# Patient Record
Sex: MALE | Race: ASIAN | NOT HISPANIC OR LATINO
[De-identification: names, ages, dates, MRNs, and addresses within clinical notes are randomized per-mention and may not be internally consistent; named-entity substitution may affect disease eponyms.]

---

## 2019-08-22 ENCOUNTER — APPOINTMENT (OUTPATIENT)
Dept: UROLOGY | Facility: CLINIC | Age: 49
End: 2019-08-22
Payer: MEDICAID

## 2019-08-22 VITALS
HEIGHT: 67 IN | HEART RATE: 87 BPM | RESPIRATION RATE: 18 BRPM | TEMPERATURE: 97.8 F | SYSTOLIC BLOOD PRESSURE: 128 MMHG | OXYGEN SATURATION: 97 % | WEIGHT: 166 LBS | BODY MASS INDEX: 26.06 KG/M2 | DIASTOLIC BLOOD PRESSURE: 80 MMHG

## 2019-08-22 DIAGNOSIS — Z78.9 OTHER SPECIFIED HEALTH STATUS: ICD-10-CM

## 2019-08-22 DIAGNOSIS — R39.198 OTHER DIFFICULTIES WITH MICTURITION: ICD-10-CM

## 2019-08-22 PROCEDURE — 99204 OFFICE O/P NEW MOD 45 MIN: CPT

## 2019-08-22 RX ORDER — CLOTRIMAZOLE AND BETAMETHASONE DIPROPIONATE 10; .5 MG/ML; MG/ML
1-0.05 LOTION TOPICAL TWICE DAILY
Qty: 1 | Refills: 1 | Status: ACTIVE | COMMUNITY
Start: 2019-08-22 | End: 1900-01-01

## 2019-08-22 NOTE — HISTORY OF PRESENT ILLNESS
[Urinary Incontinence] : no urinary incontinence [FreeTextEntry1] : Patient is a 50 yo M who presents for redundant prepuce and phimosis for past 2 months.  He is uncircumcised.  Prior to this he had no issues with foreskin and able to retract.  Denies trauma or infection or any obvious inciting event.  Denies h/o diabetes.\par \par He reports sharp moderate penile pain with sexual intercourse, has not been sexually active as a result.  He obtain erections without difficutly.  No diffiulty urinating except for spraying with urination.\par \par He denies diabetes - reports recent testing with PCP showed no diabetes. [Urinary Retention] : no urinary retention [Dysuria] : no dysuria [Hematuria - Gross] : no gross hematuria

## 2019-08-22 NOTE — ASSESSMENT
[FreeTextEntry1] : Patient is a 48 yo M who presents with redundant prepuce/phimosis causing spraying of urination and penile pain,.\par \par I discussed with pt that he has phimosis.  Discussed with pt trial of topical steroid cream. Rx given.  If no improvement would consider circumcision.  Counseled on proper hygiene. \par I also discussed with pt the circumcision procedure and its risks/benefits/alternatives.  I d/w pt that there will be a scar circumferentially around his shaft after foreskin removed, and there are cosmetic considerations to the scar and differences in shaft and foreskin color.  I also d/w pt that he may experience sexual changes/numbness after circumcision.  Also d/w pt typical postop recovery and need for abstinence after circumcision.

## 2019-08-22 NOTE — PHYSICAL EXAM
[General Appearance - Well Developed] : well developed [General Appearance - Well Nourished] : well nourished [Normal Appearance] : normal appearance [Well Groomed] : well groomed [Edema] : no peripheral edema [General Appearance - In No Acute Distress] : no acute distress [Exaggerated Use Of Accessory Muscles For Inspiration] : no accessory muscle use [Respiration, Rhythm And Depth] : normal respiratory rhythm and effort [Abdomen Tenderness] : non-tender [Abdomen Soft] : soft [Costovertebral Angle Tenderness] : no ~M costovertebral angle tenderness [Urethral Meatus] : meatus normal [Scrotum] : the scrotum was normal [Urinary Bladder Findings] : the bladder was normal on palpation [Testes Mass (___cm)] : there were no testicular masses [Testes Tenderness] : no tenderness of the testes [Normal Station and Gait] : the gait and station were normal for the patient's age [] : no rash [No Focal Deficits] : no focal deficits [Oriented To Time, Place, And Person] : oriented to person, place, and time [Mood] : the mood was normal [Affect] : the affect was normal [Not Anxious] : not anxious [No Palpable Adenopathy] : no palpable adenopathy [FreeTextEntry1] : uncirc phallus, phimotic foreskin, unable to retract.  Able to partially visualize glans/meatus - no masses

## 2019-08-27 RX ORDER — CLOTRIMAZOLE 10 MG/G
1 CREAM TOPICAL TWICE DAILY
Qty: 1 | Refills: 1 | Status: ACTIVE | COMMUNITY
Start: 2019-08-27 | End: 1900-01-01

## 2019-08-27 RX ORDER — BETAMETHASONE DIPROPIONATE 0.5 MG/G
0.05 CREAM TOPICAL TWICE DAILY
Qty: 1 | Refills: 1 | Status: ACTIVE | COMMUNITY
Start: 2019-08-27 | End: 1900-01-01

## 2019-09-26 ENCOUNTER — APPOINTMENT (OUTPATIENT)
Dept: UROLOGY | Facility: CLINIC | Age: 49
End: 2019-09-26
Payer: MEDICAID

## 2019-09-26 VITALS
RESPIRATION RATE: 17 BRPM | HEART RATE: 88 BPM | DIASTOLIC BLOOD PRESSURE: 77 MMHG | SYSTOLIC BLOOD PRESSURE: 129 MMHG | BODY MASS INDEX: 25.53 KG/M2 | OXYGEN SATURATION: 98 % | TEMPERATURE: 98.7 F | WEIGHT: 163 LBS

## 2019-09-26 DIAGNOSIS — N48.89 OTHER SPECIFIED DISORDERS OF PENIS: ICD-10-CM

## 2019-09-26 PROCEDURE — 99213 OFFICE O/P EST LOW 20 MIN: CPT

## 2019-09-26 NOTE — HISTORY OF PRESENT ILLNESS
[FreeTextEntry1] : Patient is a 48 yo M who presents for redundant prepuce and phimosis for past 2 months.  He is uncircumcised.  Prior to this he had no issues with foreskin and able to retract.  Denies trauma or infection or any obvious inciting event.  Denies h/o diabetes.\par \par He reports sharp moderate penile pain with sexual intercourse, has not been sexually active as a result.  He obtain erections without difficutly.  No diffiulty urinating except for spraying with urination.\par \par Interval hx:\par He has tried topical cream for past 1 month, he has had softening of phimosis but still unable to fully expose/retract penis and still having pain with sex and spraying.

## 2019-09-26 NOTE — ASSESSMENT
[FreeTextEntry1] : Patient is a 48 yo M who presents with redundant prepuce/phimosis causing spraying of urination and penile pain.\par \par I discussed with pt that he has phimosis. There was some but not complete resolution of his phimosis with conservative topical cream.\par I d/w pt other option is circumcision. Discussed with pt the circumcision procedure and its risks/benefits/alternatives.  I d/w pt that there will be a scar circumferentially around his shaft after foreskin removed, and there are cosmetic considerations to the scar and differences in shaft and foreskin color.  I also d/w pt that he may experience sexual changes/numbness after circumcision.  Also d/w pt typical postop recovery and need for abstinence after circumcision. \par He wishes to proceed, will schedule.

## 2019-09-26 NOTE — PHYSICAL EXAM
[General Appearance - Well Developed] : well developed [General Appearance - Well Nourished] : well nourished [Normal Appearance] : normal appearance [Well Groomed] : well groomed [General Appearance - In No Acute Distress] : no acute distress [Urethral Meatus] : meatus normal [Urinary Bladder Findings] : the bladder was normal on palpation [Scrotum] : the scrotum was normal [Testes Tenderness] : no tenderness of the testes [Testes Mass (___cm)] : there were no testicular masses [FreeTextEntry1] : uncirc phallus, phimotic foreskin, unable to retract fully - softer and more open but still significantly phimotic.  Able to partially visualize glans/meatus - no masses

## 2019-10-23 ENCOUNTER — OUTPATIENT (OUTPATIENT)
Dept: OUTPATIENT SERVICES | Facility: HOSPITAL | Age: 49
LOS: 1 days | End: 2019-10-23
Payer: MEDICAID

## 2019-10-23 VITALS
DIASTOLIC BLOOD PRESSURE: 84 MMHG | OXYGEN SATURATION: 100 % | HEIGHT: 66 IN | RESPIRATION RATE: 16 BRPM | SYSTOLIC BLOOD PRESSURE: 144 MMHG | HEART RATE: 98 BPM | TEMPERATURE: 98 F | WEIGHT: 162.04 LBS

## 2019-10-23 DIAGNOSIS — N47.1 PHIMOSIS: ICD-10-CM

## 2019-10-23 DIAGNOSIS — Z01.818 ENCOUNTER FOR OTHER PREPROCEDURAL EXAMINATION: ICD-10-CM

## 2019-10-23 LAB
ANION GAP SERPL CALC-SCNC: 19 MMOL/L — HIGH (ref 5–17)
BUN SERPL-MCNC: 15 MG/DL — SIGNIFICANT CHANGE UP (ref 7–23)
CALCIUM SERPL-MCNC: 9.6 MG/DL — SIGNIFICANT CHANGE UP (ref 8.4–10.5)
CHLORIDE SERPL-SCNC: 96 MMOL/L — SIGNIFICANT CHANGE UP (ref 96–108)
CO2 SERPL-SCNC: 24 MMOL/L — SIGNIFICANT CHANGE UP (ref 22–31)
CREAT SERPL-MCNC: 1.01 MG/DL — SIGNIFICANT CHANGE UP (ref 0.5–1.3)
GLUCOSE SERPL-MCNC: 251 MG/DL — HIGH (ref 70–99)
HCT VFR BLD CALC: 43.9 % — SIGNIFICANT CHANGE UP (ref 39–50)
HGB BLD-MCNC: 13.2 G/DL — SIGNIFICANT CHANGE UP (ref 13–17)
MCHC RBC-ENTMCNC: 22.6 PG — LOW (ref 27–34)
MCHC RBC-ENTMCNC: 30.1 GM/DL — LOW (ref 32–36)
MCV RBC AUTO: 75.2 FL — LOW (ref 80–100)
PLATELET # BLD AUTO: 231 K/UL — SIGNIFICANT CHANGE UP (ref 150–400)
POTASSIUM SERPL-MCNC: 3.4 MMOL/L — LOW (ref 3.5–5.3)
POTASSIUM SERPL-SCNC: 3.4 MMOL/L — LOW (ref 3.5–5.3)
RBC # BLD: 5.84 M/UL — HIGH (ref 4.2–5.8)
RBC # FLD: 14.2 % — SIGNIFICANT CHANGE UP (ref 10.3–14.5)
SODIUM SERPL-SCNC: 139 MMOL/L — SIGNIFICANT CHANGE UP (ref 135–145)
WBC # BLD: 8.49 K/UL — SIGNIFICANT CHANGE UP (ref 3.8–10.5)
WBC # FLD AUTO: 8.49 K/UL — SIGNIFICANT CHANGE UP (ref 3.8–10.5)

## 2019-10-23 PROCEDURE — G0463: CPT

## 2019-10-23 PROCEDURE — 85027 COMPLETE CBC AUTOMATED: CPT

## 2019-10-23 PROCEDURE — 80048 BASIC METABOLIC PNL TOTAL CA: CPT

## 2019-10-23 RX ORDER — CEFAZOLIN SODIUM 1 G
2000 VIAL (EA) INJECTION ONCE
Refills: 0 | Status: DISCONTINUED | OUTPATIENT
Start: 2019-10-30 | End: 2019-11-16

## 2019-10-23 NOTE — H&P PST ADULT - NSANTHOSAYNRD_GEN_A_CORE
No. ASAF screening performed.  STOP BANG Legend: 0-2 = LOW Risk; 3-4 = INTERMEDIATE Risk; 5-8 = HIGH Risk

## 2019-10-23 NOTE — H&P PST ADULT - HISTORY OF PRESENT ILLNESS
50 yo M with no significant PMH c/o phimosis & redundant prepuce x 6 months. Pt had urology evaluation- scheduled for circumcision on 10/30/2019.

## 2019-10-23 NOTE — H&P PST ADULT - NSICDXPROBLEM_GEN_ALL_CORE_FT
PROBLEM DIAGNOSES  Problem: Phimosis  Assessment and Plan: circumcision  labs- CBC, BMP  Pre op instructions discussed

## 2019-10-29 ENCOUNTER — TRANSCRIPTION ENCOUNTER (OUTPATIENT)
Age: 49
End: 2019-10-29

## 2019-10-29 VITALS — HEIGHT: 65.75 IN | WEIGHT: 162.04 LBS

## 2019-10-29 NOTE — PRE-ANESTHESIA EVALUATION ADULT - NSANTHPMHFT_GEN_ALL_CORE
48 yo M with no significant PMH c/o phimosis & redundant prepuce x 6 months. Pt had urology evaluation- scheduled for circumcision on 10/30/2019.

## 2019-10-30 ENCOUNTER — TRANSCRIPTION ENCOUNTER (OUTPATIENT)
Age: 49
End: 2019-10-30

## 2019-10-30 ENCOUNTER — APPOINTMENT (OUTPATIENT)
Dept: UROLOGY | Facility: HOSPITAL | Age: 49
End: 2019-10-30

## 2019-10-30 ENCOUNTER — RESULT REVIEW (OUTPATIENT)
Age: 49
End: 2019-10-30

## 2019-10-30 ENCOUNTER — OUTPATIENT (OUTPATIENT)
Dept: OUTPATIENT SERVICES | Facility: HOSPITAL | Age: 49
LOS: 1 days | End: 2019-10-30
Payer: MEDICAID

## 2019-10-30 VITALS
HEART RATE: 76 BPM | DIASTOLIC BLOOD PRESSURE: 69 MMHG | OXYGEN SATURATION: 100 % | SYSTOLIC BLOOD PRESSURE: 107 MMHG | RESPIRATION RATE: 16 BRPM | TEMPERATURE: 98 F

## 2019-10-30 DIAGNOSIS — Z01.818 ENCOUNTER FOR OTHER PREPROCEDURAL EXAMINATION: ICD-10-CM

## 2019-10-30 DIAGNOSIS — N47.1 PHIMOSIS: ICD-10-CM

## 2019-10-30 PROBLEM — N48.89 OTHER SPECIFIED DISORDERS OF PENIS: Chronic | Status: ACTIVE | Noted: 2019-10-23

## 2019-10-30 LAB — GLUCOSE BLDC GLUCOMTR-MCNC: 173 MG/DL — HIGH (ref 70–99)

## 2019-10-30 PROCEDURE — 88304 TISSUE EXAM BY PATHOLOGIST: CPT | Mod: 26

## 2019-10-30 PROCEDURE — 54161 CIRCUM 28 DAYS OR OLDER: CPT

## 2019-10-30 PROCEDURE — 88304 TISSUE EXAM BY PATHOLOGIST: CPT

## 2019-10-30 PROCEDURE — 54164 FRENULOTOMY OF PENIS: CPT | Mod: 59

## 2019-10-30 PROCEDURE — 82962 GLUCOSE BLOOD TEST: CPT

## 2019-10-30 RX ORDER — SODIUM CHLORIDE 9 MG/ML
3 INJECTION INTRAMUSCULAR; INTRAVENOUS; SUBCUTANEOUS EVERY 8 HOURS
Refills: 0 | Status: DISCONTINUED | OUTPATIENT
Start: 2019-10-30 | End: 2019-10-30

## 2019-10-30 RX ORDER — SODIUM CHLORIDE 9 MG/ML
1000 INJECTION, SOLUTION INTRAVENOUS
Refills: 0 | Status: DISCONTINUED | OUTPATIENT
Start: 2019-10-30 | End: 2019-11-16

## 2019-10-30 RX ORDER — ACETAMINOPHEN WITH CODEINE 300MG-30MG
1 TABLET ORAL
Qty: 6 | Refills: 0
Start: 2019-10-30 | End: 2019-10-31

## 2019-10-30 RX ORDER — HYDROMORPHONE HYDROCHLORIDE 2 MG/ML
0.25 INJECTION INTRAMUSCULAR; INTRAVENOUS; SUBCUTANEOUS
Refills: 0 | Status: DISCONTINUED | OUTPATIENT
Start: 2019-10-30 | End: 2019-10-30

## 2019-10-30 RX ORDER — LIDOCAINE HCL 20 MG/ML
0.2 VIAL (ML) INJECTION ONCE
Refills: 0 | Status: DISCONTINUED | OUTPATIENT
Start: 2019-10-30 | End: 2019-10-30

## 2019-10-30 RX ORDER — IBUPROFEN 200 MG
1 TABLET ORAL
Qty: 9 | Refills: 0
Start: 2019-10-30 | End: 2019-11-01

## 2019-10-30 RX ORDER — ONDANSETRON 8 MG/1
4 TABLET, FILM COATED ORAL ONCE
Refills: 0 | Status: DISCONTINUED | OUTPATIENT
Start: 2019-10-30 | End: 2019-10-30

## 2019-10-30 RX ORDER — HYDROMORPHONE HYDROCHLORIDE 2 MG/ML
0.5 INJECTION INTRAMUSCULAR; INTRAVENOUS; SUBCUTANEOUS
Refills: 0 | Status: DISCONTINUED | OUTPATIENT
Start: 2019-10-30 | End: 2019-10-30

## 2019-10-30 RX ORDER — CEPHALEXIN 500 MG
1 CAPSULE ORAL
Qty: 9 | Refills: 0
Start: 2019-10-30 | End: 2019-11-01

## 2019-10-30 RX ADMIN — SODIUM CHLORIDE 3 MILLILITER(S): 9 INJECTION INTRAMUSCULAR; INTRAVENOUS; SUBCUTANEOUS at 11:53

## 2019-10-30 NOTE — DISCHARGE NOTE NURSING/CASE MANAGEMENT/SOCIAL WORK - PATIENT PORTAL LINK FT
You can access the FollowMyHealth Patient Portal offered by [facility] by registering at the following website: [email or web address]. By joining [email or web address] FollowMyHealth portal, you will also be able to view your health information using other applications (apps) compatible with our system.

## 2019-10-30 NOTE — ASU DISCHARGE PLAN (ADULT/PEDIATRIC) - ASU DC SPECIAL INSTRUCTIONSFT
Please follow up with Dr. Dumont within 2 weeks after discharge from the hospital. You may call  to schedule an appointment.     Please finish 3 days of Keflex. You may take ibuprofen 600mg as needed for moderate pain. You may take Tylenol with codeine #3 as needed for severe pain.

## 2019-11-04 LAB — SURGICAL PATHOLOGY STUDY: SIGNIFICANT CHANGE UP

## 2019-11-06 ENCOUNTER — APPOINTMENT (OUTPATIENT)
Age: 49
End: 2019-11-06

## 2019-11-18 ENCOUNTER — APPOINTMENT (OUTPATIENT)
Dept: UROLOGY | Facility: CLINIC | Age: 49
End: 2019-11-18
Payer: MEDICAID

## 2019-11-18 VITALS
WEIGHT: 164 LBS | TEMPERATURE: 98 F | BODY MASS INDEX: 25.69 KG/M2 | HEART RATE: 86 BPM | SYSTOLIC BLOOD PRESSURE: 119 MMHG | RESPIRATION RATE: 17 BRPM | DIASTOLIC BLOOD PRESSURE: 75 MMHG | OXYGEN SATURATION: 100 %

## 2019-11-18 DIAGNOSIS — N47.1 PHIMOSIS: ICD-10-CM

## 2019-11-18 PROCEDURE — 99213 OFFICE O/P EST LOW 20 MIN: CPT

## 2019-11-18 NOTE — ASSESSMENT
[FreeTextEntry1] : Patient is a 50 yo M who presents with phimosis s/p circumcision.\par \par Doing well\par F/u 1 mo

## 2019-11-18 NOTE — HISTORY OF PRESENT ILLNESS
[FreeTextEntry1] : Patient is a 50 yo M who presents for phimosis s/p circumcision.\par \par Doing well post circ.  Minimal pain.  No fever/chills.  Has not had any erections yet.\par

## 2019-11-18 NOTE — PHYSICAL EXAM
[General Appearance - Well Nourished] : well nourished [General Appearance - Well Developed] : well developed [Normal Appearance] : normal appearance [Well Groomed] : well groomed [General Appearance - In No Acute Distress] : no acute distress [Urinary Bladder Findings] : the bladder was normal on palpation [Scrotum] : the scrotum was normal [Testes Tenderness] : no tenderness of the testes [Testes Mass (___cm)] : there were no testicular masses [FreeTextEntry1] : well healing circ suture line, minimal edema.  sutures in place, intact

## 2020-01-02 ENCOUNTER — APPOINTMENT (OUTPATIENT)
Dept: UROLOGY | Facility: CLINIC | Age: 50
End: 2020-01-02
